# Patient Record
Sex: MALE | Race: WHITE | NOT HISPANIC OR LATINO
[De-identification: names, ages, dates, MRNs, and addresses within clinical notes are randomized per-mention and may not be internally consistent; named-entity substitution may affect disease eponyms.]

---

## 2018-03-20 ENCOUNTER — APPOINTMENT (OUTPATIENT)
Dept: INTERNAL MEDICINE | Facility: CLINIC | Age: 28
End: 2018-03-20
Payer: COMMERCIAL

## 2018-03-20 ENCOUNTER — NON-APPOINTMENT (OUTPATIENT)
Age: 28
End: 2018-03-20

## 2018-03-20 VITALS
WEIGHT: 142 LBS | HEIGHT: 71 IN | DIASTOLIC BLOOD PRESSURE: 80 MMHG | BODY MASS INDEX: 19.88 KG/M2 | SYSTOLIC BLOOD PRESSURE: 140 MMHG | HEART RATE: 79 BPM | TEMPERATURE: 98.2 F | OXYGEN SATURATION: 99 %

## 2018-03-20 DIAGNOSIS — Z23 ENCOUNTER FOR IMMUNIZATION: ICD-10-CM

## 2018-03-20 DIAGNOSIS — Z00.00 ENCOUNTER FOR GENERAL ADULT MEDICAL EXAMINATION W/OUT ABNORMAL FINDINGS: ICD-10-CM

## 2018-03-20 DIAGNOSIS — Z87.898 PERSONAL HISTORY OF OTHER SPECIFIED CONDITIONS: ICD-10-CM

## 2018-03-20 PROCEDURE — 93000 ELECTROCARDIOGRAM COMPLETE: CPT

## 2018-03-20 PROCEDURE — 90471 IMMUNIZATION ADMIN: CPT

## 2018-03-20 PROCEDURE — 90715 TDAP VACCINE 7 YRS/> IM: CPT

## 2018-03-20 PROCEDURE — 99385 PREV VISIT NEW AGE 18-39: CPT | Mod: 25

## 2018-03-20 PROCEDURE — 36415 COLL VENOUS BLD VENIPUNCTURE: CPT

## 2018-03-23 LAB
ALBUMIN SERPL ELPH-MCNC: 5 G/DL
ALP BLD-CCNC: 58 U/L
ALT SERPL-CCNC: 17 U/L
ANION GAP SERPL CALC-SCNC: 15 MMOL/L
APPEARANCE: CLEAR
AST SERPL-CCNC: 21 U/L
BASOPHILS # BLD AUTO: 0.01 K/UL
BASOPHILS NFR BLD AUTO: 0.2 %
BILIRUB SERPL-MCNC: 0.6 MG/DL
BILIRUBIN URINE: NEGATIVE
BLOOD URINE: NEGATIVE
BUN SERPL-MCNC: 17 MG/DL
CALCIUM SERPL-MCNC: 9.6 MG/DL
CHLORIDE SERPL-SCNC: 101 MMOL/L
CHOLEST SERPL-MCNC: 196 MG/DL
CHOLEST/HDLC SERPL: 2.3 RATIO
CO2 SERPL-SCNC: 25 MMOL/L
COLOR: YELLOW
CREAT SERPL-MCNC: 0.96 MG/DL
EOSINOPHIL # BLD AUTO: 0.04 K/UL
EOSINOPHIL NFR BLD AUTO: 0.6 %
GLUCOSE QUALITATIVE U: NEGATIVE MG/DL
GLUCOSE SERPL-MCNC: 84 MG/DL
HBA1C MFR BLD HPLC: 4.9 %
HCT VFR BLD CALC: 44.1 %
HDLC SERPL-MCNC: 87 MG/DL
HGB BLD-MCNC: 15.3 G/DL
IMM GRANULOCYTES NFR BLD AUTO: 0.2 %
KETONES URINE: ABNORMAL
LDLC SERPL CALC-MCNC: 100 MG/DL
LEUKOCYTE ESTERASE URINE: NEGATIVE
LYMPHOCYTES # BLD AUTO: 1.83 K/UL
LYMPHOCYTES NFR BLD AUTO: 29.5 %
MAN DIFF?: NORMAL
MCHC RBC-ENTMCNC: 32.6 PG
MCHC RBC-ENTMCNC: 34.7 GM/DL
MCV RBC AUTO: 93.8 FL
MONOCYTES # BLD AUTO: 0.56 K/UL
MONOCYTES NFR BLD AUTO: 9 %
NEUTROPHILS # BLD AUTO: 3.76 K/UL
NEUTROPHILS NFR BLD AUTO: 60.5 %
NITRITE URINE: NEGATIVE
PH URINE: 5.5
PLATELET # BLD AUTO: 248 K/UL
POTASSIUM SERPL-SCNC: 3.4 MMOL/L
PROT SERPL-MCNC: 7.7 G/DL
PROTEIN URINE: NEGATIVE MG/DL
RBC # BLD: 4.7 M/UL
RBC # FLD: 12.3 %
SODIUM SERPL-SCNC: 141 MMOL/L
SPECIFIC GRAVITY URINE: 1.02
T4 FREE SERPL-MCNC: 1.4 NG/DL
TRIGL SERPL-MCNC: 43 MG/DL
TSH SERPL-ACNC: 1.45 UIU/ML
UROBILINOGEN URINE: NEGATIVE MG/DL
WBC # FLD AUTO: 6.21 K/UL

## 2018-04-05 ENCOUNTER — TRANSCRIPTION ENCOUNTER (OUTPATIENT)
Age: 28
End: 2018-04-05

## 2019-10-15 ENCOUNTER — APPOINTMENT (OUTPATIENT)
Dept: HUMAN REPRODUCTION | Facility: CLINIC | Age: 29
End: 2019-10-15

## 2019-12-04 ENCOUNTER — APPOINTMENT (OUTPATIENT)
Dept: HUMAN REPRODUCTION | Facility: CLINIC | Age: 29
End: 2019-12-04

## 2021-03-03 ENCOUNTER — OUTPATIENT (OUTPATIENT)
Dept: OUTPATIENT SERVICES | Facility: HOSPITAL | Age: 31
LOS: 1 days | End: 2021-03-03
Payer: COMMERCIAL

## 2021-03-03 DIAGNOSIS — Z11.52 ENCOUNTER FOR SCREENING FOR COVID-19: ICD-10-CM

## 2021-03-03 LAB — SARS-COV-2 RNA SPEC QL NAA+PROBE: SIGNIFICANT CHANGE UP

## 2021-03-03 PROCEDURE — U0003: CPT

## 2021-03-03 PROCEDURE — C9803: CPT

## 2021-03-03 PROCEDURE — U0005: CPT

## 2021-11-10 ENCOUNTER — EMERGENCY (EMERGENCY)
Facility: HOSPITAL | Age: 31
LOS: 1 days | Discharge: HOME | End: 2021-11-10
Attending: EMERGENCY MEDICINE | Admitting: EMERGENCY MEDICINE
Payer: SELF-PAY

## 2021-11-10 VITALS
HEART RATE: 77 BPM | OXYGEN SATURATION: 98 % | SYSTOLIC BLOOD PRESSURE: 134 MMHG | RESPIRATION RATE: 16 BRPM | TEMPERATURE: 99 F | DIASTOLIC BLOOD PRESSURE: 83 MMHG

## 2021-11-10 VITALS
OXYGEN SATURATION: 98 % | RESPIRATION RATE: 15 BRPM | DIASTOLIC BLOOD PRESSURE: 87 MMHG | TEMPERATURE: 99 F | HEIGHT: 72 IN | SYSTOLIC BLOOD PRESSURE: 149 MMHG | HEART RATE: 77 BPM | WEIGHT: 154.98 LBS

## 2021-11-10 PROCEDURE — 12001 RPR S/N/AX/GEN/TRNK 2.5CM/<: CPT

## 2021-11-10 PROCEDURE — 73140 X-RAY EXAM OF FINGER(S): CPT | Mod: 26,LT

## 2021-11-10 PROCEDURE — 73140 X-RAY EXAM OF FINGER(S): CPT

## 2021-11-10 PROCEDURE — 99053 MED SERV 10PM-8AM 24 HR FAC: CPT

## 2021-11-10 PROCEDURE — 99283 EMERGENCY DEPT VISIT LOW MDM: CPT | Mod: 25

## 2021-11-10 PROCEDURE — 99283 EMERGENCY DEPT VISIT LOW MDM: CPT

## 2021-11-10 RX ADMIN — Medication 300 MILLIGRAM(S): at 06:42

## 2021-11-10 NOTE — ED PROVIDER NOTE - NSFOLLOWUPINSTRUCTIONS_ED_ALL_ED_FT
1) Return in 7-10 days for suture removal  2) Return to Emergency room for any worsening or persistent pain, weakness, fever, or any other concerning symptoms.  3) See attached instruction sheets for additional information, including information regarding signs and symptoms to look out for, reasons to seek immediate care and other important instructions.  4) Clindamycin 300mg three times a day for 7 days        WHAT YOU NEED TO KNOW:    Stitches, or sutures, are used to close cuts and wounds on the skin. Stitches need to be removed after your wound has healed.           DISCHARGE INSTRUCTIONS:    Return to the emergency department if:   •Your stitches come apart.      •Blood soaks through your bandages.      •You suddenly cannot move your injured joint.      •You have sudden numbness around your wound.      •You see red streaks coming from your wound.      Contact your healthcare provider if:   •You have a fever and chills.      •Your wound is red, warm, swollen, or leaking pus.      •There is a bad smell coming from your wound.      •You have increased pain in the wound area.      •You have questions or concerns about your condition or care.      Care for your stitches:   •Protect the stitches. You may need to cover your stitches with a bandage for 24 to 48 hours, or as directed. Do not bump or hit the suture area. This could open the wound. Do not trim or shorten the ends of your stitches. If they rub on your clothing, put a gauze bandage between the stitches and your clothes.      •Clean the area as directed. Carefully wash your wound with soap and water. For mouth and lip wounds, rinse your mouth after meals and at bedtime. Ask your healthcare provider what to use to rinse your mouth. If you have a scalp wound, you may gently wash your hair every 2 days with mild shampoo. Do not use hair products, such as hair spray. Check your wound for signs of infection when you clean it. Signs include redness, swelling, and pus.      •Keep the area dry as directed. Wait 12 to 24 hours after you receive your stitches before you take a shower. Take showers instead of baths. Do not take a bath or swim. Your healthcare provider will give you instructions for bathing with your stitches.      Help your wound heal:   •Elevate your wound. Keep your wound above the level of your heart as often as you can. This will help decrease swelling and pain. Prop the area on pillows or blankets, if possible, to keep it elevated comfortably.      •Limit activity. Do not stretch the skin around your wound. This will help prevent bleeding and swelling.      Follow up with your healthcare provider as directed: You may need to return to have your stitches removed. Write down your questions so you remember to ask them during your visits.

## 2021-11-10 NOTE — ED PROVIDER NOTE - OBJECTIVE STATEMENT
32 yo male, tetanus utd c/o left 5th digit crush injury while at work this morning, +laceration.  No numbness/tingling.  No medications taken for this.

## 2021-11-10 NOTE — ED PROVIDER NOTE - DOMESTIC TRAVEL HIGH RISK QUESTION
How Severe Is Your Acne?: moderate Is This A New Presentation, Or A Follow-Up?: Acne Additional Comments (Use Complete Sentences): Just saw Dr Clay last week Wednesday Feb 2/10/2021 No

## 2021-11-10 NOTE — ED PROVIDER NOTE - NS ED ROS FT

## 2021-11-10 NOTE — ED ADULT NURSE NOTE - OBJECTIVE STATEMENT
Patient came in to ED c/o left 5th finger injury/ laceration while at work tonight. Patient states he is up-to-date with tetanus shot.

## 2021-11-10 NOTE — ED PROVIDER NOTE - PHYSICAL EXAMINATION
Gen: Alert, NAD  Head/eyes: NC/AT  ENT: airway patent  Neck: supple, no tenderness/meningismus/JVD, Trachea midline  Pulm/lung: Bilateral clear BS, normal resp effort, no wheeze/stridor/retractions  CV/heart: RRR, no M/R/G, +2 dist pulses (radial, pedal DP/PT, popliteal)  GI/Abd: soft, NT/ND, +BS, no guarding/rebound tenderness  Musculoskeletal: no edema/erythema/cyanosis, FROM in all extremities, no C/T/L spine ttp  Skin: left 5th digit +2.5 vertical laceration distal phalanx, no bony/tendon exposure, no nail involvement  Neuro: AAOx3, CN 2-12 intact, normal sensation, 5/5 motor strength in all extremities, normal gait,

## 2021-11-10 NOTE — ED PROVIDER NOTE - PATIENT PORTAL LINK FT
You can access the FollowMyHealth Patient Portal offered by [facility] by registering at the following website: [email or web address]. By joining [employer]’s FollowMyHealth portal, you will also be able to view your health information using other applications (apps) compatible with our system.

## 2021-11-11 RX ORDER — CETIRIZINE HYDROCHLORIDE 10 MG/1
1 TABLET ORAL
Qty: 0 | Refills: 0 | DISCHARGE

## 2021-11-20 ENCOUNTER — EMERGENCY (EMERGENCY)
Facility: HOSPITAL | Age: 31
LOS: 1 days | Discharge: HOME | End: 2021-11-20
Attending: EMERGENCY MEDICINE | Admitting: EMERGENCY MEDICINE
Payer: SELF-PAY

## 2021-11-20 VITALS
DIASTOLIC BLOOD PRESSURE: 84 MMHG | WEIGHT: 160.06 LBS | OXYGEN SATURATION: 98 % | RESPIRATION RATE: 18 BRPM | SYSTOLIC BLOOD PRESSURE: 145 MMHG | HEIGHT: 72 IN | HEART RATE: 81 BPM | TEMPERATURE: 98 F

## 2021-11-20 PROBLEM — Z78.9 OTHER SPECIFIED HEALTH STATUS: Chronic | Status: ACTIVE | Noted: 2021-11-10

## 2021-11-20 PROCEDURE — G0463: CPT

## 2021-11-20 PROCEDURE — L9995: CPT

## 2021-11-20 NOTE — ED PROVIDER NOTE - NS ED ROS FT
Constitutional: - Fever, - Chills, - Anorexia, - Fatigue, - Night sweats  MSK: - Myalgias, - Arthralgias, - Weakness, - Deformities, - Injuries  SKIN: - Color change, - Rash, - Swelling, - Ecchymosis, - Abrasion, - laceration

## 2021-11-20 NOTE — ED PROVIDER NOTE - OBJECTIVE STATEMENT
32 yo male here for left 5th digit suture removal from crush injury 9 days ago.  No pain/swelling/redness. Admits to continue working his job since the injury.

## 2021-11-20 NOTE — ED PROVIDER NOTE - PATIENT PORTAL LINK FT
You can access the FollowMyHealth Patient Portal offered by [facility] by registering at the following website: [email or web address]. By joining [employer]’s FollowMyHealth portal, you will also be able to view your health information using other applications (apps) compatible with our system.

## 2021-11-20 NOTE — ED PROVIDER NOTE - PHYSICAL EXAMINATION
Gen: Alert, NAD  Head/eyes: NC/AT  Musculoskeletal: no edema/erythema/cyanosis, FROM in all extremities, no C/T/L spine ttp  Skin: left 5th digit +5 sutures c/d/i, no erythema, no drainage  Neuro: AAOx3, CN 2-12 intact, normal sensation, 5/5 motor strength in all extremities, normal gait, no dysmetria

## 2021-11-30 ENCOUNTER — APPOINTMENT (OUTPATIENT)
Dept: INTERNAL MEDICINE | Facility: CLINIC | Age: 31
End: 2021-11-30

## 2024-02-14 ENCOUNTER — EMERGENCY (EMERGENCY)
Facility: HOSPITAL | Age: 34
LOS: 1 days | Discharge: HOME | End: 2024-02-14
Admitting: EMERGENCY MEDICINE
Payer: OTHER MISCELLANEOUS

## 2024-02-14 VITALS
SYSTOLIC BLOOD PRESSURE: 147 MMHG | HEART RATE: 84 BPM | OXYGEN SATURATION: 97 % | TEMPERATURE: 98 F | RESPIRATION RATE: 18 BRPM | DIASTOLIC BLOOD PRESSURE: 84 MMHG

## 2024-02-14 VITALS
TEMPERATURE: 98 F | OXYGEN SATURATION: 100 % | SYSTOLIC BLOOD PRESSURE: 123 MMHG | HEART RATE: 62 BPM | RESPIRATION RATE: 18 BRPM | DIASTOLIC BLOOD PRESSURE: 86 MMHG

## 2024-02-14 PROCEDURE — 73562 X-RAY EXAM OF KNEE 3: CPT | Mod: 26,RT

## 2024-02-14 PROCEDURE — 99284 EMERGENCY DEPT VISIT MOD MDM: CPT

## 2024-02-14 RX ORDER — KETOROLAC TROMETHAMINE 30 MG/ML
60 SYRINGE (ML) INJECTION ONCE
Refills: 0 | Status: DISCONTINUED | OUTPATIENT
Start: 2024-02-14 | End: 2024-02-14

## 2024-02-14 RX ADMIN — Medication 60 MILLIGRAM(S): at 13:21

## 2024-02-14 RX ADMIN — Medication 60 MILLIGRAM(S): at 12:51

## 2024-02-14 NOTE — ED PROVIDER NOTE - CLINICAL SUMMARY MEDICAL DECISION MAKING FREE TEXT BOX
this is a 33 y.o male with no PMhx presented to the ED complaining of having right knee pain, he states that he was at work when he accidentally stepped into a pothole, knee pain- xray, pain control, reassess

## 2024-02-14 NOTE — ED ADULT NURSE NOTE - OBJECTIVE STATEMENT
Patient received to wellness room 5 A&Ox4, ambulatory, accompanied by friend coming to the ED for complaints of right sided knee pain. Patient states he fell into a pothole while working today and strained his right knee. able to weight bear on extremity. Describes pain to be deep in the knee cap. Patient denies head-strike, loc. States "I never hit the floor." RR equal and unlabored. Denies chest pain, sob. Medicated as ordered.  Care plan continued. Comfort measures provided. Safety maintained.

## 2024-02-14 NOTE — ED PROVIDER NOTE - PATIENT PORTAL LINK FT
You can access the FollowMyHealth Patient Portal offered by [facility] by registering at the following website: [email or web address]. By joining [employer]’s FollowMyHealth portal, you will also be able to view your health information using other applications (apps) compatible with our system.

## 2024-02-14 NOTE — ED PROVIDER NOTE - ADDITIONAL NOTES AND INSTRUCTIONS:
Mr. Mcardle can be on light duty until he is cleared by the orthopedic. Mr. Mcardle can return back to work.

## 2024-02-14 NOTE — ED ADULT NURSE NOTE - NSFALLUNIVINTERV_ED_ALL_ED
Bed/Stretcher in lowest position, wheels locked, appropriate side rails in place/Call bell, personal items and telephone in reach/Instruct patient to call for assistance before getting out of bed/chair/stretcher/Non-slip footwear applied when patient is off stretcher/[place] to call system/Physically safe environment - no spills, clutter or unnecessary equipment/Purposeful proactive rounding/Room/bathroom lighting operational, light cord in reach

## 2024-02-14 NOTE — ED PROVIDER NOTE - MUSCULOSKELETAL MINIMAL EXAM
full ROM of the knee, no obvious trauma, no deformities, noted. tenderness over the patella tendon./atraumatic/normal range of motion

## 2024-02-14 NOTE — ED PROVIDER NOTE - NSFOLLOWUPINSTRUCTIONS_ED_ALL_ED_FT
Rest, drink plenty of fluids.  Advance activity as tolerated.  Continue all previously prescribed medications as directed.  Follow up with your primary care physician in 48-72 hours- bring copies of your results.  Return to the ER for worsening or persistent symptoms, and/or ANY NEW OR CONCERNING SYMPTOMS. If you have issues obtaining follow up, please call: [phone] to obtain a doctor or specialist who takes your insurance in your area.  You may call [phone] to make an appointment with the internal medicine clinic.

## 2024-02-14 NOTE — ED ADULT NURSE REASSESSMENT NOTE - NS ED NURSE REASSESS COMMENT FT1
Patient to be discharged at this time. A&Ox4, ambulatory with steady gait. RR equal and unlabored. DC paperwork provided by PA. No IV placed during stay. Safety maintained.

## 2024-02-14 NOTE — ED PROVIDER NOTE - OBJECTIVE STATEMENT
this is a 33 y.o male with no PMhx presented to the ED complaining of having right knee pain, he states that he was at work when he accidentally stepped into a pothole, he states that he hyperextended his right knee, he denies having any actual trauma to the knee, he denies having any numbness or tingling, he denies having any fever, chills, bodyaches, headaches, dizziness,

## 2024-02-26 ENCOUNTER — APPOINTMENT (OUTPATIENT)
Dept: ORTHOPEDIC SURGERY | Facility: CLINIC | Age: 34
End: 2024-02-26
Payer: OTHER MISCELLANEOUS

## 2024-02-26 VITALS
BODY MASS INDEX: 21.67 KG/M2 | HEIGHT: 72 IN | HEART RATE: 88 BPM | SYSTOLIC BLOOD PRESSURE: 127 MMHG | WEIGHT: 160 LBS | DIASTOLIC BLOOD PRESSURE: 80 MMHG

## 2024-02-26 DIAGNOSIS — S89.91XA UNSPECIFIED INJURY OF RIGHT LOWER LEG, INITIAL ENCOUNTER: ICD-10-CM

## 2024-02-26 PROCEDURE — 99204 OFFICE O/P NEW MOD 45 MIN: CPT

## 2024-02-27 NOTE — DISCUSSION/SUMMARY
[de-identified] :  Right knee injury, concern for posterior cruciate ligament (PCL) tear.  I discussed with him that the PCL is one of the 4 main ligaments of the knee that gives it stability. Having a torn PCL can compromise the stability of the knee.  Because of his activity level, work, and significant pain and restricted range of motion, I recommend an MRI to evaluate for this further.  This will allow us to evaluate the ligaments, meniscus, cartilage.  Discussed that if there is a severe PCL tear, he may require surgery to reconstruct the PCL and stabilize the knee.  For this reason, I recommend that he have the MRI prior to trying any treatment.  He will follow-up once the MRI is complete.   I have personally obtained the history, reviewed the ROS as noted, and performed the physical examination today. The patient and I discussed the assessment and options and developed the plan. All questions were answered and the patient stated their understanding of the treatment plan and appreciation of the visit.  My cumulative time spent on this patient's visit included: Preparation for the visit, review of the medical records, review of pertinent diagnostic studies, examination and counseling of the patient on the above diagnosis, treatment plan and prognosis, orders of diagnostic tests, medications and/or appropriate procedures and documentation in the medical records of today's visit.  This note was generated using dragon medical dictation software.  A reasonable effort has been made for proofreading its contents, but typos may still remain.  If there are any questions or points of clarification needed please notify my office.

## 2024-02-27 NOTE — ASSESSMENT
[Indicate if, in your opinion, the incident that the patient described was the competent medical cause of this injury/illness.] : The incident that the patient described was the competent medical cause of this injury/illness: Yes [Indicate if the patient's complaints are consistent with his/her history of the injury/illness.] : Indicate if the patient's complaints are consistent with his/her history of the injury/illness: Yes [Yes] : Yes, it is consistent [Can the patient return to usual work activities as indicated? If yes, indicate date___] : The patient can return to usual work activities on [unfilled] [Are there any work limitations? (If so, explain and quantify, including the anticipated duration of the limitations)] : There are work limitations. [FreeTextEntry5] : 49 [FreeTextEntry1] : Right knee PCL sprain [FreeTextEntry7] : No heavy lifting

## 2024-02-27 NOTE — HISTORY OF PRESENT ILLNESS
Epidural Block Start time: 11/30/2018 5:59 PM 
End time: 11/30/2018 6:08 PM 
Performed by: Paulo Tran DO Authorized by: Paulo Tran DO  
 
Pre-Procedure Indication: at surgeon's request and labor epidural   
Preanesthetic Checklist: patient identified, risks and benefits discussed, anesthesia consent, site marked, patient being monitored, timeout performed and anesthesia consent Timeout Time: 17:59 Epidural:  
Patient position:  Seated Prep region:  Lumbar Prep: Patient draped and Chlorhexidine Location:  L3-4 Needle and Epidural Catheter:  
Needle Type:  Tuohy Needle Gauge:  17 G Injection Technique:  Loss of resistance using air Attempts:  1 Catheter Size:  20 G Catheter at Skin Depth (cm):  12 Depth in Epidural Space (cm):  7 Events: no blood with aspiration, no cerebrospinal fluid with aspiration, no paresthesia and negative aspiration test   
 
Assessment:  
Catheter Secured:  Tegaderm and tape (filter) Insertion:  Uncomplicated Patient tolerance:  Patient tolerated the procedure well with no immediate complications [Yes] : The patient is currently working. [de-identified] : WILLIAM MCARDLE  is a 33 year old M who presents with right knee injury.  He says on 2/14/2024 he stepped in a pothole at work and he hyperextended his right knee.  He says that afterward he felt pain in the knee.  He denies any significant swelling in the knee, but he says that since then he has had pain deep in the knee.  He says that particularly when he kneels down he feels a very severe pain in his knee.  He is able to walk, but says that he feels instability of the knee when he does so. 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

## 2024-02-27 NOTE — PHYSICAL EXAM
[de-identified] : General: No apparent distress Cardiovascular: extremities warm and well-perfused, no cyanosis Pulmonary: non labored respirations    Right hip: No pain with hip range of motion  Right knee: Skin is intact, warm, and dry Motor and sensory intact, lower extremity warm and well-perfused  Disuse atrophy: None Effusion: None  Active ROM: 0 to 130 degrees of flexion Passive ROM: Same as active Crepitation: None  Joint Line tenderness: Medial joint line tenderness to palpation Patellar facet tenderness: None Patellar tracking: Midline Patellar mobility/apprehension: Normal/none  Ligamentous Exam: Negative anterior drawer, 1+ posterior drawer, stable to varus and valgus stress at 0 and 30 degrees of flexion  Alignment: Neutral   Left hip: No pain with hip range of motion  Left knee:  Skin is intact, warm, and dry Motor and sensory intact, lower extremity warm and well-perfused  Disuse atrophy: None Effusion: None  Active ROM: 0 to 130 degrees of flexion Passive ROM: Same as active Crepitation: None  Joint Line tenderness: None Patellar facet tenderness: None Patellar tracking: Midline Patellar mobility/apprehension: Normal/none  Ligamentous Exam: Negative anterior drawer, negative posterior drawer, stable to varus and valgus stress at 0 and 30 degrees of flexion  Alignment: Neutral [de-identified] : X-rays of the right knee obtained on 2/14/2024 demonstrate no acute fracture or dislocation

## 2024-03-15 ENCOUNTER — APPOINTMENT (OUTPATIENT)
Dept: MRI IMAGING | Facility: CLINIC | Age: 34
End: 2024-03-15
Payer: OTHER MISCELLANEOUS

## 2024-03-15 PROCEDURE — 73721 MRI JNT OF LWR EXTRE W/O DYE: CPT | Mod: RT

## 2024-03-19 ENCOUNTER — NON-APPOINTMENT (OUTPATIENT)
Age: 34
End: 2024-03-19

## 2024-03-19 ENCOUNTER — APPOINTMENT (OUTPATIENT)
Dept: ORTHOPEDIC SURGERY | Facility: CLINIC | Age: 34
End: 2024-03-19
Payer: OTHER MISCELLANEOUS

## 2024-03-19 VITALS
WEIGHT: 160 LBS | HEART RATE: 91 BPM | DIASTOLIC BLOOD PRESSURE: 83 MMHG | SYSTOLIC BLOOD PRESSURE: 134 MMHG | HEIGHT: 72 IN | BODY MASS INDEX: 21.67 KG/M2

## 2024-03-19 DIAGNOSIS — T14.8XXA OTHER INJURY OF UNSPECIFIED BODY REGION, INITIAL ENCOUNTER: ICD-10-CM

## 2024-03-19 PROCEDURE — 99213 OFFICE O/P EST LOW 20 MIN: CPT

## 2024-03-19 RX ORDER — MELOXICAM 15 MG/1
15 TABLET ORAL DAILY
Qty: 10 | Refills: 1 | Status: ACTIVE | COMMUNITY
Start: 2024-03-19 | End: 1900-01-01

## 2024-03-19 NOTE — PHYSICAL EXAM
[de-identified] : General: No apparent distress Cardiovascular: extremities warm and well-perfused, no cyanosis Pulmonary: non labored respirations  Right hip: No pain with hip range of motion  Right knee: Skin is intact, warm, and dry Motor and sensory intact, lower extremity warm and well-perfused  Disuse atrophy: None Effusion: None  Active ROM: 0 to 130 degrees of flexion Passive ROM: Same as active Crepitation: None  Joint Line tenderness: None Patellar facet tenderness: None Patellar tracking: Midline Patellar mobility/apprehension: Normal/none  Ligamentous Exam: Negative anterior drawer, negative posterior drawer, stable to varus and valgus stress at 0 and 30 degrees of flexion  Alignment: Neutral  [de-identified] : MRI  Images reviewed in detail with the patient

## 2024-03-19 NOTE — HISTORY OF PRESENT ILLNESS
[de-identified] : History: 3/19/2024: He returns for follow-up of his right knee and for MRI review.  He says that overall the knee has been better than it was before.  He does not have any pain when he walks or works.  He only has some pain when he kneels on the knee in a certain way.  He has been using Motrin so far for the pain.  2/26/2024: WILLIAM MCARDLE  is a 33 year old M who presents with right knee injury.  He says on 2/14/2024 he stepped in a pothole at work and he hyperextended his right knee.  He says that afterward he felt pain in the knee.  He denies any significant swelling in the knee, but he says that since then he has had pain deep in the knee.  He says that particularly when he kneels down he feels a very severe pain in his knee.  He is able to walk, but says that he feels instability of the knee when he does so. 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 [Yes] : The patient is currently working.

## 2024-03-19 NOTE — DISCUSSION/SUMMARY
[de-identified] : Right knee tibial bone bruise.  Discussed with him that it is encouraging that the soft tissue structures of the knee were intact with no ligament or meniscus tear.  Bone bruises can typically be treated nonoperatively.  He may return to work as tolerated, but should avoid kneeling for the next month or so.  I will prescribe him meloxicam 15 mg daily to be taken with food for the next 10 days to help her with the pain. I discussed with them that they should not take this while also taking Aleve (Naprosyn), Motrin/ Advil (Ibuprofen), Toradol (ketoralac). They must stop taking it if they develops stomach pain, increased bleeding or bruising and they should follow-up with their primary care doctor for routine blood work including kidney function to monitor its effect. While it Is not a habit-forming substance, it should only be taken as needed and to discontinue use once symptoms have resolved.  He may follow-up on an as-needed basis.  I have personally obtained the history, reviewed the ROS as noted, and performed the physical examination today. The patient and I discussed the assessment and options and developed the plan. All questions were answered and the patient stated their understanding of the treatment plan and appreciation of the visit.  My cumulative time spent on this patient's visit included: Preparation for the visit, review of the medical records, review of pertinent diagnostic studies, examination and counseling of the patient on the above diagnosis, treatment plan and prognosis, orders of diagnostic tests, medications and/or appropriate procedures and documentation in the medical records of today's visit.  This note was generated using dragon medical dictation software.  A reasonable effort has been made for proofreading its contents, but typos may still remain.  If there are any questions or points of clarification needed please notify my office.
